# Patient Record
Sex: MALE | Race: WHITE | NOT HISPANIC OR LATINO | Employment: OTHER | URBAN - METROPOLITAN AREA
[De-identification: names, ages, dates, MRNs, and addresses within clinical notes are randomized per-mention and may not be internally consistent; named-entity substitution may affect disease eponyms.]

---

## 2020-10-29 ENCOUNTER — OFFICE VISIT (OUTPATIENT)
Dept: NEUROLOGY | Facility: CLINIC | Age: 70
End: 2020-10-29

## 2020-10-29 VITALS
OXYGEN SATURATION: 98 % | BODY MASS INDEX: 35.61 KG/M2 | HEART RATE: 61 BPM | DIASTOLIC BLOOD PRESSURE: 58 MMHG | TEMPERATURE: 97.5 F | SYSTOLIC BLOOD PRESSURE: 110 MMHG | HEIGHT: 60 IN | WEIGHT: 181.4 LBS

## 2020-10-29 DIAGNOSIS — G40.409 OTHER GENERALIZED EPILEPSY, NOT INTRACTABLE, WITHOUT STATUS EPILEPTICUS (HCC): ICD-10-CM

## 2020-10-29 PROBLEM — G40.909 EPILEPSY (HCC): Status: ACTIVE | Noted: 2020-10-29

## 2020-10-29 PROCEDURE — 99204 OFFICE O/P NEW MOD 45 MIN: CPT | Performed by: NURSE PRACTITIONER

## 2020-10-29 RX ORDER — TIZANIDINE 2 MG/1
TABLET ORAL
COMMUNITY
Start: 2020-09-06

## 2020-10-29 RX ORDER — ALBUTEROL SULFATE 2.5 MG/3ML
2.5 SOLUTION RESPIRATORY (INHALATION) EVERY 4 HOURS PRN
COMMUNITY

## 2020-10-29 RX ORDER — ATENOLOL 25 MG/1
25 TABLET ORAL DAILY
COMMUNITY

## 2020-10-29 RX ORDER — NITROGLYCERIN 0.4 MG/1
TABLET SUBLINGUAL
COMMUNITY

## 2020-10-29 RX ORDER — ICOSAPENT ETHYL 1000 MG/1
2 CAPSULE ORAL 2 TIMES DAILY WITH MEALS
COMMUNITY

## 2020-10-29 RX ORDER — BUDESONIDE AND FORMOTEROL FUMARATE DIHYDRATE 160; 4.5 UG/1; UG/1
2 AEROSOL RESPIRATORY (INHALATION)
COMMUNITY

## 2020-10-29 RX ORDER — RANOLAZINE 1000 MG/1
TABLET, EXTENDED RELEASE ORAL
COMMUNITY

## 2020-10-29 RX ORDER — ATORVASTATIN CALCIUM 40 MG/1
40 TABLET, FILM COATED ORAL DAILY
COMMUNITY
Start: 2020-10-13

## 2020-10-29 RX ORDER — AMLODIPINE BESYLATE 5 MG/1
TABLET ORAL
COMMUNITY

## 2020-10-29 RX ORDER — ASPIRIN 81 MG/1
TABLET ORAL
COMMUNITY

## 2020-10-29 RX ORDER — ISOSORBIDE MONONITRATE 60 MG/1
TABLET, EXTENDED RELEASE ORAL
COMMUNITY

## 2020-10-29 RX ORDER — DIVALPROEX SODIUM 500 MG/1
TABLET, DELAYED RELEASE ORAL EVERY 12 HOURS SCHEDULED
COMMUNITY

## 2020-10-29 RX ORDER — DEXLANSOPRAZOLE 60 MG/1
CAPSULE, DELAYED RELEASE ORAL
COMMUNITY

## 2020-10-29 RX ORDER — MONTELUKAST SODIUM 10 MG/1
TABLET ORAL
COMMUNITY

## 2020-10-29 NOTE — ASSESSMENT & PLAN NOTE
Due to lack of seizures in the last 20 years and no report of auras or new symptoms I do not recommend changing his Depakote dosage. Continue Depakote 500 mg PO BID.     Patient and wife educated and informed to contact me if something changes.     At this time they prefer to continue seizure follow up with his PCP unless something changes.     They may follow up as needed.

## 2020-10-29 NOTE — PROGRESS NOTES
Subjective   Patient ID: April Zimmerman is a 70 y.o. male     Chief Complaint   Patient presents with   • Epilepsy        History of Present Illness  70 y.o. male referred by ZEE Nicholas for Epilepsy.     He was diagnosed with Grand mal seizures in his 40's, he would go unconscious, have myoclonic jerking, headache upon waking up, no confusion that they can remember, no N/V. He would start feeling poorly prior to having a siezure. His wife states after his CABG the seizures stopped and he has been taking his Depakote regularly.     Has been seizure free for over 15 to 20 years per patient and his wife.    He takes Depakote 500 mg PO BID.     He has had a heat stroke in 2001, and several days later he was very confused for about a week. His right side stopped working. He was hospitalized for 5 days at . He takes an 81 mg Aspirin daily and Lipitor 40 mg daily.     ZEE Nicholas note reviewed and summarized from 9/10/20:  Patient with HX of Epilepsy, taking Depakote.     Labs 9/10/20: Depakote level 39.7, patient states he was sent to Neurology due to the low level.    Past Medical History:   Diagnosis Date   • Diabetes (CMS/HCC)    • Epilepsia (CMS/[facility])    • History of stroke      Family History   Problem Relation Age of Onset   • Heart disease Mother    • Lung cancer Mother    • Heart disease Father    • Lung cancer Father    • Heart attack Brother      Social History     Socioeconomic History   • Marital status:      Spouse name: Not on file   • Number of children: Not on file   • Years of education: Not on file   • Highest education level: Not on file   Tobacco Use   • Smoking status: Never Smoker   • Smokeless tobacco: Current User     Types: Chew   Substance and Sexual Activity   • Alcohol use: Never     Frequency: Never   • Drug use: Never   • Sexual activity: Defer       Review of Systems   Constitutional: Negative for activity change, appetite change, chills, diaphoresis, fatigue, fever and  "unexpected weight change.   HENT: Negative for ear pain, facial swelling, hearing loss, mouth sores, sinus pressure, sinus pain and trouble swallowing.    Eyes: Negative for photophobia, pain and visual disturbance.   Respiratory: Negative for choking and shortness of breath.    Cardiovascular: Negative for chest pain, palpitations and leg swelling.   Gastrointestinal: Negative for constipation, nausea and vomiting.   Endocrine: Negative for cold intolerance and heat intolerance.   Genitourinary: Negative for difficulty urinating, dysuria, frequency and urgency.   Musculoskeletal: Negative for arthralgias, back pain, gait problem, joint swelling, myalgias, neck pain and neck stiffness.   Allergic/Immunologic: Negative for environmental allergies and food allergies.   Neurological: Negative for dizziness, tremors, seizures, syncope, facial asymmetry, speech difficulty, weakness, light-headedness, numbness and headaches.   Hematological: Negative for adenopathy. Does not bruise/bleed easily.   Psychiatric/Behavioral: Negative for agitation, behavioral problems, confusion, decreased concentration, dysphoric mood, hallucinations, self-injury, sleep disturbance and suicidal ideas. The patient is not nervous/anxious and is not hyperactive.        Objective     Vitals:    10/29/20 0821   BP: 110/58   Pulse: 61   Temp: 97.5 °F (36.4 °C)   SpO2: 98%   Weight: 82.3 kg (181 lb 6.4 oz)   Height: 152.4 cm (60\")       Neurologic Exam     Mental Status   Oriented to person, place, and time.   Registration: recalls 3 of 3 objects. Recall at 5 minutes: recalls 3 of 3 objects. Follows 3 step commands.   Attention: normal. Concentration: normal.   Speech: speech is normal   Level of consciousness: alert  Knowledge: good and consistent with education.   Able to name object. Able to read. Able to repeat. Able to write. Normal comprehension.     Cranial Nerves     CN II   Visual fields full to confrontation.   Visual acuity: " normal  Right visual field deficit: none  Left visual field deficit: none     CN III, IV, VI   Pupils are equal, round, and reactive to light.  Extraocular motions are normal.   Right pupil: Shape: regular. Reactivity: brisk. Consensual response: intact.   Left pupil: Shape: regular. Reactivity: brisk. Consensual response: intact.   Nystagmus: none   Diplopia: none  Ophthalmoparesis: none  Upgaze: normal  Downgaze: normal  Conjugate gaze: present  Vestibulo-ocular reflex: present    CN V   Facial sensation intact.   Right corneal reflex: normal  Left corneal reflex: normal    CN VII   Right facial weakness: none  Left facial weakness: none    CN VIII   Hearing: intact    CN IX, X   Palate: symmetric  Right gag reflex: normal  Left gag reflex: normal    CN XI   Right sternocleidomastoid strength: normal  Left sternocleidomastoid strength: normal    CN XII   Tongue: not atrophic  Fasciculations: absent  Tongue deviation: none    Motor Exam   Muscle bulk: normal  Overall muscle tone: normal  Right arm tone: normal  Left arm tone: normal  Right leg tone: normal  Left leg tone: normal    Strength   Strength 5/5 throughout.     Sensory Exam   Light touch normal.   Vibration normal.   Proprioception normal.   Pinprick normal.     Gait, Coordination, and Reflexes     Gait  Gait: normal    Coordination   Romberg: negative  Finger to nose coordination: normal  Heel to shin coordination: normal  Tandem walking coordination: normal    Tremor   Resting tremor: absent  Intention tremor: absent  Action tremor: absent    Reflexes   Reflexes 2+ except as noted.       Physical Exam  Vitals signs and nursing note reviewed.   Constitutional:       Appearance: Normal appearance.   HENT:      Head: Normocephalic and atraumatic.   Eyes:      Extraocular Movements: Extraocular movements intact and EOM normal.      Pupils: Pupils are equal, round, and reactive to light.      Funduscopic exam:     Right eye: No hemorrhage, exudate or  papilledema.         Left eye: No hemorrhage, exudate or papilledema.   Cardiovascular:      Rate and Rhythm: Normal rate.   Pulmonary:      Effort: Pulmonary effort is normal.   Musculoskeletal: Normal range of motion.   Skin:     General: Skin is warm and dry.      Capillary Refill: Capillary refill takes less than 2 seconds.   Neurological:      Mental Status: He is alert and oriented to person, place, and time.      Coordination: Finger-Nose-Finger Test, Heel to Shin Test and Romberg Test normal.      Gait: Gait is intact. Tandem walk normal.      Deep Tendon Reflexes: Strength normal.   Psychiatric:         Mood and Affect: Mood normal.         Speech: Speech normal.         Behavior: Behavior normal.         No results found for any previous visit.         Assessment/Plan     Problem List Items Addressed This Visit        Nervous and Auditory    Epilepsy (CMS/HCC)    Current Assessment & Plan     Due to lack of seizures in the last 20 years and no report of auras or new symptoms I do not recommend changing his Depakote dosage. Continue Depakote 500 mg PO BID.     Patient and wife educated and informed to contact me if something changes.     At this time they prefer to continue seizure follow up with his PCP unless something changes.     They may follow up as needed.          Relevant Medications    divalproex (Depakote) 500 MG DR tablet             Return if symptoms worsen or fail to improve.

## 2024-07-12 ENCOUNTER — OFFICE VISIT (OUTPATIENT)
Dept: NEUROLOGY | Facility: CLINIC | Age: 74
End: 2024-07-12
Payer: MEDICARE

## 2024-07-12 VITALS
SYSTOLIC BLOOD PRESSURE: 108 MMHG | DIASTOLIC BLOOD PRESSURE: 62 MMHG | HEART RATE: 60 BPM | BODY MASS INDEX: 28.42 KG/M2 | OXYGEN SATURATION: 97 % | WEIGHT: 176.8 LBS | HEIGHT: 66 IN

## 2024-07-12 DIAGNOSIS — G45.9 TIA (TRANSIENT ISCHEMIC ATTACK): ICD-10-CM

## 2024-07-12 DIAGNOSIS — E53.8 VITAMIN B12 DEFICIENCY: Primary | ICD-10-CM

## 2024-07-12 PROCEDURE — 1159F MED LIST DOCD IN RCRD: CPT | Performed by: NURSE PRACTITIONER

## 2024-07-12 PROCEDURE — 1160F RVW MEDS BY RX/DR IN RCRD: CPT | Performed by: NURSE PRACTITIONER

## 2024-07-12 PROCEDURE — 99204 OFFICE O/P NEW MOD 45 MIN: CPT | Performed by: NURSE PRACTITIONER

## 2024-07-12 RX ORDER — LANOLIN ALCOHOL/MO/W.PET/CERES
1000 CREAM (GRAM) TOPICAL DAILY
COMMUNITY

## 2024-07-12 RX ORDER — FUROSEMIDE 20 MG/1
20 TABLET ORAL
COMMUNITY

## 2024-07-12 RX ORDER — MUPIROCIN CALCIUM 20 MG/G
CREAM TOPICAL
COMMUNITY

## 2024-07-12 RX ORDER — RIVAROXABAN 15 MG/1
TABLET, FILM COATED ORAL
COMMUNITY

## 2024-07-12 RX ORDER — GABAPENTIN 300 MG/1
1 CAPSULE ORAL 3 TIMES DAILY
COMMUNITY
Start: 2024-05-17

## 2024-07-12 RX ORDER — LANCETS 33 GAUGE
EACH MISCELLANEOUS
COMMUNITY
Start: 2024-04-22

## 2024-07-12 RX ORDER — LEVOTHYROXINE SODIUM 0.03 MG/1
1 TABLET ORAL DAILY
COMMUNITY
Start: 2024-05-09

## 2024-07-12 RX ORDER — CETIRIZINE HYDROCHLORIDE 10 MG/1
10 TABLET ORAL
COMMUNITY
Start: 2024-01-22

## 2024-07-15 NOTE — PROGRESS NOTES
Neuro Office Visit      Encounter Date: 2024   Patient Name: April Zimmerman  : 1950   MRN: [id number]   PCP:  Ricky Asher PA-C     Chief Complaint:    Chief Complaint   Patient presents with    Transient Ischemic Attack       History of Present Illness: April Zimmerman is a 74 y.o. male who is here today in Neurology for possible TIA, falls.  History of Present Illness  The patient presents for evaluation of multiple medical concerns. He is accompanied by his wife.    The patient experienced a bout of bacterial pneumonia in 2023, which subsequently led to atrial fibrillation (AFib).     Post-discharge, he experienced frequent falls, confusion, and disorientation, leading to a near-syncope episode.     Experienced a backward fall on Thanksgiving Day. In 2023, he experienced significant weakness, urinary incontinence, and lack of motor skills, necessitating an ambulance call.    Cognitive function improved immediately following an 8-week home-based physical and occupational therapy session.     B12 levels were found to be deficient, prompting the administration of B12 injections, which significantly improved his strength and appetite.     Currently, his B12 level is well-managed with oral B12 supplementation.     MRI of his brain in 2023 revealed volume loss in the brain and chronic small vessel changes. Despite occasional dizziness, he denies any difficulty with ambulation.     Last fall occurred in 2023. His cognitive function is normal, and he continues to drive without difficulty. His most recent A1c was 5.6.    Supplemental Information:  He has diabetes. He had open heart surgery in  and had stents placed. He had 2 years of appendicitis and gallbladder surgery. He had surgery on his right carpal tunnel. He has had several colonoscopies and endoscopies. On 2023, he had hemorrhoid surgery. He has been having trouble with seizures since . He has arthritis.   He has  never been a smoker, but he chews tobacco. He denies any alcohol, drugs, or vaping.   Both of his parents had heart disease and lung cancer. His brother had a heart attack. His father  of lung cancer. All of his brothers and sisters have epilepsy. One of his sisters had a brain injury. One of his sisters had colon cancer a couple of years ago. His grandmother had thyroid issues and seizures.      Subjective      Past Medical History:   Past Medical History:   Diagnosis Date    A-fib 2023    s/p cardioversion    Bacterial pneumonia 2023    Diabetes     Epilepsia     History of stroke     Pneumonia 2024    Weakness        Past Surgical History:   Past Surgical History:   Procedure Laterality Date    APPENDECTOMY      CARDIAC SURGERY      Quadruple    CAROTID STENT      CARPAL TUNNEL RELEASE Left     CHOLECYSTECTOMY      COLONOSCOPY      Several    ENDOSCOPY      HEMORRHOIDECTOMY         Family History:   Family History   Problem Relation Age of Onset    Heart disease Mother     Lung cancer Mother     Heart disease Father     Lung cancer Father     Heart attack Brother        Social History:   Social History     Socioeconomic History    Marital status:    Tobacco Use    Smoking status: Never    Smokeless tobacco: Current     Types: Chew    Tobacco comments:     Continues to chew   Vaping Use    Vaping status: Never Used   Substance and Sexual Activity    Alcohol use: Never    Drug use: Never    Sexual activity: Defer       Medications:     Current Outpatient Medications:     albuterol (PROVENTIL) (2.5 MG/3ML) 0.083% nebulizer solution, Take 2.5 mg by nebulization Every 4 (Four) Hours As Needed for Wheezing., Disp: , Rfl:     amLODIPine (NORVASC) 5 MG tablet, amlodipine 5 mg tablet, Disp: , Rfl:     aspirin (Aspir-Low) 81 MG EC tablet, Aspir-Low 81 mg tablet,delayed release  Take 1 tablet every day by oral route., Disp: , Rfl:     atenolol (TENORMIN) 25 MG tablet, Take 1 tablet by mouth Daily., Disp:  , Rfl:     atorvastatin (LIPITOR) 40 MG tablet, Take 1 tablet by mouth Daily., Disp: , Rfl:     budesonide-formoterol (SYMBICORT) 160-4.5 MCG/ACT inhaler, Inhale 2 puffs 2 (Two) Times a Day., Disp: , Rfl:     cetirizine (ZyrTEC Allergy) 10 MG tablet, 1 tablet., Disp: , Rfl:     dexlansoprazole (Dexilant) 60 MG capsule, Dexilant 60 mg capsule, delayed release, Disp: , Rfl:     divalproex (Depakote) 500 MG DR tablet, Every 12 (Twelve) Hours., Disp: , Rfl:     furosemide (Lasix) 20 MG tablet, 1 tablet., Disp: , Rfl:     gabapentin (NEURONTIN) 300 MG capsule, Take 1 capsule by mouth 3 times a day., Disp: , Rfl:     icosapent ethyl (Vascepa) 1 g capsule capsule, Take 2 g by mouth 2 (Two) Times a Day With Meals., Disp: , Rfl:     isosorbide mononitrate (IMDUR) 60 MG 24 hr tablet, isosorbide mononitrate ER 60 mg tablet,extended release 24 hr, Disp: , Rfl:     Lancets (OneTouch Delica Plus [id number]) misc, CHANGE DAILY, Disp: , Rfl:     levothyroxine (SYNTHROID, LEVOTHROID) 25 MCG tablet, Take 1 tablet by mouth Daily., Disp: , Rfl:     linaclotide (Linzess) 145 MCG capsule capsule, Take 1 capsule by mouth Every Morning Before Breakfast., Disp: , Rfl:     metFORMIN (GLUCOPHAGE) 1000 MG tablet, metformin 1,000 mg tablet, Disp: , Rfl:     metoprolol tartrate (LOPRESSOR) 25 MG tablet, Take 1 tablet by mouth Every 12 (Twelve) Hours., Disp: , Rfl:     montelukast (SINGULAIR) 10 MG tablet, montelukast 10 mg tablet, Disp: , Rfl:     mupirocin (BACTROBAN) 2 % cream, , Disp: , Rfl:     nitroglycerin (NITROSTAT) 0.4 MG SL tablet, nitroglycerin 0.4 mg sublingual tablet, Disp: , Rfl:     ranolazine (Ranexa) 1000 MG 12 hr tablet, Ranexa 1,000 mg tablet,extended release, Disp: , Rfl:     tiZANidine (ZANAFLEX) 2 MG tablet, TAKE 1 TABLET BY MOUTH THREE TIMES A DAY AS NEEDED FOR SPASMS, Disp: , Rfl:     vitamin B-12 (CYANOCOBALAMIN) 1000 MCG tablet, Take 1 tablet by mouth Daily., Disp: , Rfl:     Xarelto 15 MG tablet, TAKE 1 TABLET BY MOUTH  DAILY AT 6 PM, Disp: , Rfl:     Allergies:   Allergies   Allergen Reactions    Ace Inhibitors Cough       PHQ-9 Total Score:     TIAN Fall Risk Assessment was completed, and patient is at HIGH risk for falls. Assessment completed on:2024    Objective     Physical Exam:     Neurologic Exam     Mental Status   Oriented to person, place, and time.   Attention: normal.   Speech: speech is normal   Level of consciousness: alert  Knowledge: good.     Cranial Nerves     CN II   Visual fields full to confrontation.     CN III, IV, VI   Pupils are equal, round, and reactive to light.  Extraocular motions are normal.   CN III: no CN III palsy  CN VI: no CN VI palsy    CN V   Facial sensation intact.     CN VII   Facial expression full, symmetric.     CN VIII   CN VIII normal.     CN IX, X   CN IX normal.   CN X normal.     CN XI   CN XI normal.     CN XII   CN XII normal.     Motor Exam   Muscle bulk: normal  Overall muscle tone: normal    Strength   Right neck flexion: 5/5  Left neck flexion: 5/5  Right neck extension: 5/5  Left neck extension: 5/5  Right deltoid: 5/5  Left deltoid: 5/5  Right biceps: 5/5  Left biceps: 5/5  Right triceps: 5/5  Left triceps: 5/5  Right wrist flexion: 5/5  Left wrist flexion: 5/5  Right wrist extension: 5/5  Left wrist extension: 5/5  Right interossei: 5/5  Left interossei: 5/5  Right abdominals: 5/5  Left abdominals: 5/5  Right iliopsoas: 5/5  Left iliopsoas: 5/5  Right quadriceps: 5/5  Left quadriceps: 5/5  Right hamstrin/5  Left hamstrin/5  Right glutei: 5/5  Left glutei: 5/5  Right anterior tibial: 5/5  Left anterior tibial: 5/5  Right posterior tibial: 5/5  Left posterior tibial: 5/5  Right peroneal: 5/5  Left peroneal: 5/5  Right gastroc: 5/5  Left gastroc: 5/5    Sensory Exam   Light touch normal.     Gait, Coordination, and Reflexes     Gait  Gait: normal    Coordination   Romberg: negative  Finger to nose coordination: normal  Heel to shin coordination: normal  Tandem  "walking coordination: normal    Tremor   Resting tremor: absent  Intention tremor: absent  Action tremor: absent    Reflexes   Right brachioradialis: 2+  Left brachioradialis: 2+  Right biceps: 2+  Left biceps: 2+  Right triceps: 2+  Left triceps: 2+  Right patellar: 2+  Left patellar: 2+  Right achilles: 2+  Left achilles: 2+  Right : 2+  Left : 2+       Vital Signs:   Vitals:    07/12/24 1047   BP: 108/62   Pulse: 60   SpO2: 97%   Weight: 80.2 kg (176 lb 12.8 oz)   Height: 167.6 cm (66\")     Body mass index is 28.54 kg/m².     Results:   Results  Laboratory Studies  Last A1c was 5.6.    Imaging  MRI of the brain shows atrophy of the brain, ventricles have gotten a little bit bigger, and chronic small vessel changes.     Imaging:   No Images in the past 120 days found..     Labs:   No results found for: \"CMP\", \"PROTEIN\", \"ANTIMOGAB\", \"[id number]\", \"JCVRESULT\", \"QUANTTBGOLD\", \"CBCDIF\", \"IGGALBSER\"     Assessment / Plan      Assessment/Plan:   Diagnoses and all orders for this visit:    1. Vitamin B12 deficiency (Primary)  Comments:  Continue oral replacement therapy    2. TIA (transient ischemic attack)  Comments:  Continue aspirin and atorvastatin  Blood pressure management  Blood glucose management           Patient Education:     Reviewed medications, potential side effects and signs and symptoms to report. Discussed risk versus benefits of treatment plan with patient and/or family-including medications, labs and radiology that may be ordered. Addressed questions and concerns during visit. Patient and/or family verbalized understanding and agree with plan. Instructed to call the office with any questions and report to ER with any life-threatening symptoms.     Follow Up:   Return if symptoms worsen or fail to improve.    I spent 30 minutes caring for April on this date of service. This time includes time spent by me in the following activities: preparing for the visit, reviewing tests, performing a " medically appropriate examination and/or evaluation, counseling and educating the patient/family/caregiver, documenting information in the medical record, and independently interpreting results and communicating that information with the patient/family/caregiver.        During this visit the following were done:  Labs Reviewed [x]    Labs Ordered []    Radiology Reports Reviewed []    Radiology Ordered []    PCP Records Reviewed []    Referring Provider Records Reviewed [x]    ER Records Reviewed []    Hospital Records Reviewed []    History Obtained From Family []    Radiology Images Reviewed []    Other Reviewed []    Records Requested []      Patient or patient representative verbalized consent for the use of Ambient Listening during the visit with  ZEE Bernal for chart documentation. 7/15/2024  12:00 EDT    ZEE Bernal   [facility]  [address] [id number] [phone]